# Patient Record
Sex: MALE | Race: ASIAN | NOT HISPANIC OR LATINO
[De-identification: names, ages, dates, MRNs, and addresses within clinical notes are randomized per-mention and may not be internally consistent; named-entity substitution may affect disease eponyms.]

---

## 2021-08-09 ENCOUNTER — TRANSCRIPTION ENCOUNTER (OUTPATIENT)
Age: 31
End: 2021-08-09

## 2022-07-07 ENCOUNTER — EMERGENCY (EMERGENCY)
Facility: HOSPITAL | Age: 32
LOS: 1 days | Discharge: HOME | End: 2022-07-07
Attending: EMERGENCY MEDICINE | Admitting: EMERGENCY MEDICINE
Payer: MEDICAID

## 2022-07-07 VITALS
HEIGHT: 67 IN | DIASTOLIC BLOOD PRESSURE: 74 MMHG | TEMPERATURE: 98 F | RESPIRATION RATE: 16 BRPM | WEIGHT: 123.9 LBS | HEART RATE: 85 BPM | OXYGEN SATURATION: 99 % | SYSTOLIC BLOOD PRESSURE: 132 MMHG

## 2022-07-07 VITALS
OXYGEN SATURATION: 98 % | RESPIRATION RATE: 16 BRPM | TEMPERATURE: 98 F | HEART RATE: 86 BPM | DIASTOLIC BLOOD PRESSURE: 76 MMHG | SYSTOLIC BLOOD PRESSURE: 112 MMHG

## 2022-07-07 PROCEDURE — 12042 INTMD RPR N-HF/GENIT2.6-7.5: CPT

## 2022-07-07 PROCEDURE — 73130 X-RAY EXAM OF HAND: CPT | Mod: 26,RT

## 2022-07-07 PROCEDURE — 10120 INC&RMVL FB SUBQ TISS SMPL: CPT | Mod: 59

## 2022-07-07 PROCEDURE — 12002 RPR S/N/AX/GEN/TRNK2.6-7.5CM: CPT | Mod: 59

## 2022-07-07 PROCEDURE — 73130 X-RAY EXAM OF HAND: CPT

## 2022-07-07 PROCEDURE — 99283 EMERGENCY DEPT VISIT LOW MDM: CPT | Mod: 25

## 2022-07-07 RX ORDER — CEPHALEXIN 500 MG
1 CAPSULE ORAL
Qty: 28 | Refills: 0
Start: 2022-07-07 | End: 2022-07-13

## 2022-07-07 RX ORDER — CEPHALEXIN 500 MG
500 CAPSULE ORAL ONCE
Refills: 0 | Status: COMPLETED | OUTPATIENT
Start: 2022-07-07 | End: 2022-07-07

## 2022-07-07 RX ADMIN — Medication 500 MILLIGRAM(S): at 23:45

## 2022-07-07 NOTE — ED PROCEDURE NOTE - ATTENDING APP SHARED VISIT CONTRIBUTION OF CARE
Procedure performed under my general supervision.

## 2022-07-07 NOTE — ED PROVIDER NOTE - CLINICAL SUMMARY MEDICAL DECISION MAKING FREE TEXT BOX
DT: I have personally performed a face to face diagnostic evaluation on this patient.  I have reviewed the PA's note and agree with the history, exam, and plan of care, except as noted.  History and Exam by me shows 31 y/o M with no pmhx presents with c/o lacerations to R hand and R index/middle finger today. Pt states that he was playing baseball and running to catch the ball and fell getting his right hand caught in a metal fence and then hit his hand into debris. Pt is RHD. States that he is UTD with tetanus. Denies numbness, tingling, head trauma, LOC, other injuries/symptoms.  .  Patient is NAD.  A n O x 3. Head NC/AT. Right hand c multiples lacs and fb on xr.  sutured and fu c hand for possible tendon lac

## 2022-07-07 NOTE — ED PROVIDER NOTE - SKIN, MLM
+2cm laceration noted to proximal aspect of dorsal R hand along 4th MCPJ, +1cm laceration noted to dorsal aspect of proximal R index finger and middle finger

## 2022-07-07 NOTE — ED PROVIDER NOTE - PROGRESS NOTE DETAILS
+FB noted to Right hand on x-ray proximal aspect of R 4th MCPJ along site of laceration. Small piece of glass removed after anesthesia used for wound repair, FB removed and laceration repaired, lacerations of R index and middle fingers repaired. will d/c home with keflex and f/u hand tomorrow to assess for ?tendon involvement and treatment. Spoke to hand on -call, Dr. Snow, discussed about concerns of possible extensor tendon involvement and FB, advised can repair lac and f/u office tomorrow to address tendon injury and FB removal.

## 2022-07-07 NOTE — ED PROVIDER NOTE - NSFOLLOWUPINSTRUCTIONS_ED_ALL_ED_FT
Follow up with hand surgeon tomorrow for evaluation of possible tendon involvement of hand (right), re-evaluation, ongoing care and treatment. Keep wound clean and dry. Suture removal in 10-14 days. Take full course of antibiotics as prescribed. Use splint for support. If having worsening of symptoms, wound redness/streaking/discharge, fever or other related symptoms, RETURN TO THE ER IMMEDIATELY.       Sutured Wound Care      Sutures are stitches that can be used to close wounds. Taking care of your wound properly can help prevent pain and infection. It can also help your wound to heal more quickly. Follow instructions from your doctor about how to care for your sutured wound.      Supplies needed:    •Soap and water.      •A clean bandage (dressing), if needed.      •Antibiotic ointment.      •A clean towel.        How to care for your sutured wound     •Keep the wound completely dry for the first 24 hours or as long as told by your doctor. After 24–48 hours, you may shower or bathe as told by your doctor. Do not soak the wound or put the wound completely under water until the sutures have been removed.    •After the first 24 hours, clean the wound once a day, or as often as your doctor tells you to. Take these steps:  •Wash the wound with soap and water.      •Rinse the wound with water. Make sure to wash all the soap off.      •Pat the wound dry with a clean towel. Do not rub the wound.      •After cleaning the wound, put a thin layer of antibiotic ointment on the wound as told by your doctor. This will help:  •Prevent infection.      •Keep the bandage from sticking to the wound.      •Follow instructions from your doctor about how to change your bandage:  •Wash your hands with soap and water. If you cannot use soap and water, use hand .      •Change your bandage at least once a day, or as often as told by your doctor. If your dressing gets wet or dirty, change it.      •Leave sutures, skin glue, or skin tape (adhesive) strips in place. They may need to stay in place for 2 weeks or longer. If tape strips get loose and curl up, you may trim the loose edges. Do not remove tape strips completely unless your doctor says it is okay.      •Check your wound every day for signs of infection. Watch for:  •Redness, swelling, or pain.      •Fluid or blood.      •Warmth.      •Pus or a bad smell.        •Have the sutures removed as told by your doctor.        Follow these instructions at home:    Medicines     •Take or apply over-the-counter and prescription medicines only as told by your doctor.      •If you were prescribed an antibiotic medicine or ointment, take or apply it as told by your doctor. Do not stop using the antibiotic even if you start to feel better.      General instructions     •Cover your wound with clothes or put sunscreen on when you are outside. Use a sunscreen of at least 30 SPF.      •Do not scratch or pick at your wound.      •Avoid stretching your wound.      •Raise (elevate) the injured area above the level of your heart while you are sitting or lying down, if possible.      •Drink enough fluids to keep your pee (urine) clear or pale yellow.      •Keep all follow-up visits as told by your doctor. This is important.        Contact a doctor if:  •You were given a tetanus shot and you have any of the following at the site where the needle went in:  •Swelling.      •Very bad pain.      •Redness.      •Bleeding.        •Your wound breaks open.      •You have redness, swelling, or pain around your wound.      •You have fluid or blood coming from your wound.      •Your wound feels warm to the touch.      •You have a fever.      •You notice something coming out of your wound, such as wood or glass.      •You have pain that does not get better with medicine.      •The skin near your wound changes color.      •You need to change your bandage often due to a lot of fluid, blood, or pus coming from the wound.      •You get a new rash.      •You get numbness around the wound.        Get help right away if:    •You have very bad swelling around your wound.      •You have pus or a bad smell coming from your wound.      •Your pain suddenly gets worse and is very bad.      •You have painful lumps near your wound or anywhere on your body.      •You have a red streak going away from your wound.    •The wound is on your hand or foot, and:  •You cannot move a finger or toe as you used to do.      •Your fingers or toes look pale or blue.      •You have numbness that spreads down your hand, foot, fingers, or toes.          Summary    •Sutures are stitches that are used to close wounds.      •Taking care of your wound properly can help prevent pain and infection.      •Keep the wound completely dry for the first 24 hours or for as long as told by your doctor. After 24–48 hours, you may shower or bathe as directed by your doctor.      This information is not intended to replace advice given to you by your health care provider. Make sure you discuss any questions you have with your health care provider.

## 2022-07-07 NOTE — ED ADULT NURSE NOTE - OBJECTIVE STATEMENT
Patient presents to ED with laceration to right hand as reported sustained while he was playing with a ball and hit himself on a fence was seen and evaluated by PA sutured done keflex provided to patient applied splint to area.

## 2022-07-07 NOTE — ED PROCEDURE NOTE - CPROC ED SITE PREP1
povidone iodine/normal saline
povidone iodine
povidone iodine/normal saline
povidone iodine/normal saline

## 2022-07-07 NOTE — ED PROCEDURE NOTE - CPROC ED POST PROC CARE GUIDE1
Verbal/written post procedure instructions were given to patient/caregiver./Instructed patient/caregiver to follow-up with primary care physician./Instructed patient/caregiver regarding signs and symptoms of infection./Keep the cast/splint/dressing clean and dry.

## 2022-07-07 NOTE — ED PROVIDER NOTE - CARE PROVIDER_API CALL
Kvng Snow (MD)  Surgery  [address] [place] [id number]  Phone: [phone]  Fax: [phone]  Follow Up Time: 1-3 Days

## 2022-07-07 NOTE — ED PROVIDER NOTE - CARE PLAN
1 Principal Discharge DX:	Hand laceration  Goal:	RIGHT  Secondary Diagnosis:	Finger laceration   Principal Discharge DX:	Hand laceration  Goal:	RIGHT  Secondary Diagnosis:	Finger laceration  Secondary Diagnosis:	Acute foreign body of hand

## 2022-07-07 NOTE — ED PROCEDURE NOTE - NS ED ATTENDING STATEMENT MOD
This was a shared visit with the BLANCHE. I reviewed and verified the documentation and independently performed the documented:

## 2022-07-07 NOTE — ED PROCEDURE NOTE - CPROC ED TIME OUT STATEMENT1
“Patient's name, , procedure and correct site were confirmed during the [facility] Timeout.”
“Patient's name, , procedure and correct site were confirmed during the [employer] Timeout.”
“Patient's name, , procedure and correct site were confirmed during the [facility] Timeout.”
“Patient's name, , procedure and correct site were confirmed during the [facility] Timeout.”

## 2022-07-07 NOTE — ED ADULT NURSE NOTE - SKIN CAPILLARY REFILL
Vital Signs Last 24 Hrs  T(C): 37.3 (21 Mar 2019 22:31), Max: 37.3 (21 Mar 2019 22:31)  T(F): 99.1 (21 Mar 2019 22:31), Max: 99.1 (21 Mar 2019 22:31)  HR: 104 (22 Mar 2019 02:29) (82 - 178)  BP: 160/95 (22 Mar 2019 02:29) (100/70 - 160/95)  BP(mean): --  RR: 20 (22 Mar 2019 02:33) (16 - 22)  SpO2: 80% (22 Mar 2019 02:33) (78% - 97%)    GEN: NAD, resting quietly  NEURO: AOx1, no focal deficits  PULM: symmetric chest rise bilaterally, no increased WOB  CV: regular rate, peripheral pulses intact  ABD: soft, ND, TTP in the RLQ with wincing, no rebound, voluntary guarding, no peritoneal signs  EXTR: no cyanosis or edema, moving all extremities
2 seconds or less

## 2022-07-07 NOTE — ED PROVIDER NOTE - OBJECTIVE STATEMENT
33 y/o M with no pmhx presents with c/o lacerations to R hand and R index/middle finger today. Pt states that he was playing baseball and running to catch the ball and fell getting his right hand caught in a metal fence and then hit his hand into debris. Pt is RHD. States that he is UTD with tetanus. Denies numbness, tingling, head trauma, LOC, other injuries/symptoms.

## 2022-07-07 NOTE — ED ADULT TRIAGE NOTE - CHIEF COMPLAINT QUOTE
Patient presents with laceration to right hand sustained while catching a ball palying baseball and he got caught in a metal fence needs sutures updated with tetanus shot

## 2022-07-07 NOTE — ED PROCEDURE NOTE - CPROC ED LACER REPAIR DETAIL1
The wound was explored to base in bloodless field./All foreign material was removed.
The wound was explored to base in bloodless field./No foreign body
The wound was explored to base in bloodless field./No foreign body

## 2022-07-07 NOTE — ED PROVIDER NOTE - MUSCULOSKELETAL, MLM
R hand: +2cm laceration noted to proximal aspect of dorsal R hand along 4th MCPJ with ?FB and ?extensor tendon involvement. +1cm laceration noted to dorsal aspect of proximal R index finger and 1cm lac of middle finger, FROM of both fingers, no bony tenderness noted, cap refill<2sec, distal pulses and sensation intact, NVI

## 2022-07-07 NOTE — ED PROVIDER NOTE - PATIENT PORTAL LINK FT
You can access the FollowMyHealth Patient Portal offered by [facility] by registering at the following website: [email or web address]. By joining "[employer]’s FollowMyHealth portal, you will also be able to view your health information using other applications (apps) compatible with our system.

## 2022-07-07 NOTE — ED PROCEDURE NOTE - CPROC ED LOCAL ANESTHESIA1
This video/telephone visit will be conducted via a call between you and your physician/provider. We have found that certain health care needs can be provided without the need for an in-person physical exam. This service lets us provide the care you need with a video /telephone conversation. If a prescription is necessary we can send it directly to your pharmacy. If lab work is needed we can place an order for that and you can then stop by our lab to have the test done at a later time.    Just as we bill insurance for in-person visits, we also bill insurance for video/telephone visits. If you have questions about your insurance coverage, we recommend that you speak with your insurance company.    Patient has given verbal consent for video/Telephone visit? Yes  Patient would like the video visit invitation sent to email:   [email or web address]  Yodit Correia CMA    Patient verified allergies, medications and pharmacy via phone. PHQ: 0 and EMMY: 0    done verbally with writer. Patient states she is ready for visit.    MN :  No activity Found     
1% lidocaine
1% lidocaine
1% lidocaine/with EPI
1% lidocaine

## 2023-05-18 NOTE — ED ADULT NURSE NOTE - CAS TRG GEN SKIN COLOR
Procedure:   EUS  Scheduled date of procedure (as of today): 6/15/23  Physician performing procedure: Dr Antunez Seat  [place] [facility] [place]   Instructions reviewed with patient by:  Hayden Baeza - mailed   Clearances: confirmed with pts wife that he is not taking xarelto Normal for race